# Patient Record
Sex: MALE | Race: WHITE | NOT HISPANIC OR LATINO | Employment: OTHER | URBAN - METROPOLITAN AREA
[De-identification: names, ages, dates, MRNs, and addresses within clinical notes are randomized per-mention and may not be internally consistent; named-entity substitution may affect disease eponyms.]

---

## 2018-04-16 DIAGNOSIS — I25.10 CORONARY ARTERY DISEASE, ANGINA PRESENCE UNSPECIFIED, UNSPECIFIED VESSEL OR LESION TYPE, UNSPECIFIED WHETHER NATIVE OR TRANSPLANTED HEART: Primary | ICD-10-CM

## 2018-04-17 ENCOUNTER — DOCUMENTATION ONLY (OUTPATIENT)
Dept: CARDIOLOGY | Facility: CLINIC | Age: 68
End: 2018-04-17

## 2018-04-17 ENCOUNTER — OFFICE VISIT (OUTPATIENT)
Dept: CARDIOLOGY | Facility: CLINIC | Age: 68
End: 2018-04-17
Payer: MEDICARE

## 2018-04-17 VITALS
HEIGHT: 67 IN | BODY MASS INDEX: 30.49 KG/M2 | HEART RATE: 77 BPM | DIASTOLIC BLOOD PRESSURE: 68 MMHG | SYSTOLIC BLOOD PRESSURE: 101 MMHG | WEIGHT: 194.25 LBS | OXYGEN SATURATION: 98 %

## 2018-04-17 DIAGNOSIS — Z01.818 PREOP TESTING: ICD-10-CM

## 2018-04-17 DIAGNOSIS — G63 POLYNEUROPATHY ASSOCIATED WITH UNDERLYING DISEASE: ICD-10-CM

## 2018-04-17 DIAGNOSIS — I73.9 PAD (PERIPHERAL ARTERY DISEASE): Primary | ICD-10-CM

## 2018-04-17 DIAGNOSIS — I25.10 CORONARY ARTERY DISEASE, ANGINA PRESENCE UNSPECIFIED, UNSPECIFIED VESSEL OR LESION TYPE, UNSPECIFIED WHETHER NATIVE OR TRANSPLANTED HEART: ICD-10-CM

## 2018-04-17 DIAGNOSIS — E11.40 TYPE 2 DIABETES MELLITUS WITH DIABETIC NEUROPATHY, WITHOUT LONG-TERM CURRENT USE OF INSULIN: ICD-10-CM

## 2018-04-17 DIAGNOSIS — I10 HYPERTENSION, UNSPECIFIED TYPE: ICD-10-CM

## 2018-04-17 PROBLEM — E11.9 DM (DIABETES MELLITUS): Status: ACTIVE | Noted: 2018-04-17

## 2018-04-17 PROBLEM — G62.9 PERIPHERAL NEUROPATHY: Status: ACTIVE | Noted: 2018-04-17

## 2018-04-17 PROCEDURE — 99999 PR PBB SHADOW E&M-EST. PATIENT-LVL III: CPT | Mod: PBBFAC,,, | Performed by: INTERNAL MEDICINE

## 2018-04-17 PROCEDURE — 99205 OFFICE O/P NEW HI 60 MIN: CPT | Mod: S$PBB,,, | Performed by: INTERNAL MEDICINE

## 2018-04-17 PROCEDURE — 99213 OFFICE O/P EST LOW 20 MIN: CPT | Mod: PBBFAC | Performed by: INTERNAL MEDICINE

## 2018-04-17 RX ORDER — SODIUM CHLORIDE 9 MG/ML
3 INJECTION, SOLUTION INTRAVENOUS CONTINUOUS
Status: CANCELLED | OUTPATIENT
Start: 2018-04-17 | End: 2018-04-17

## 2018-04-17 RX ORDER — CLOPIDOGREL BISULFATE 75 MG/1
75 TABLET ORAL DAILY
Refills: 0 | COMMUNITY
Start: 2018-03-27

## 2018-04-17 RX ORDER — ISOSORBIDE MONONITRATE 30 MG/1
30 TABLET, EXTENDED RELEASE ORAL 2 TIMES DAILY
Refills: 12 | COMMUNITY
Start: 2018-03-27

## 2018-04-17 RX ORDER — LISINOPRIL 5 MG/1
TABLET ORAL
Refills: 0 | COMMUNITY
Start: 2018-03-27

## 2018-04-17 RX ORDER — GABAPENTIN 300 MG/1
300 CAPSULE ORAL 3 TIMES DAILY
Refills: 6 | COMMUNITY
Start: 2018-03-27

## 2018-04-17 RX ORDER — ATORVASTATIN CALCIUM 40 MG/1
40 TABLET, FILM COATED ORAL DAILY
Refills: 0 | COMMUNITY
Start: 2018-03-27

## 2018-04-17 RX ORDER — DIPHENHYDRAMINE HCL 25 MG
50 CAPSULE ORAL ONCE
Status: CANCELLED | OUTPATIENT
Start: 2018-04-17 | End: 2018-04-17

## 2018-04-17 RX ORDER — DILTIAZEM HYDROCHLORIDE 180 MG/1
180 CAPSULE, COATED, EXTENDED RELEASE ORAL DAILY
Refills: 0 | COMMUNITY
Start: 2018-03-27

## 2018-04-17 RX ORDER — METFORMIN HYDROCHLORIDE 1000 MG/1
1000 TABLET ORAL 2 TIMES DAILY
Refills: 3 | COMMUNITY
Start: 2018-04-11

## 2018-04-17 NOTE — PROGRESS NOTES
"lSubjective:    Patient ID:  Darren Ceballos is a 67 y.o. male who presents for evaluation of Peripheral Arterial Disease    Referring Doctor: Dr. Bennie Erwin    HPI    This is 66 y/o M w/ PAD, HTN, DM, CAD s/p PCI 12/27/17 by Dr. Arvizu, and Peripheral Neuropathy of R Leg. Referred for R SFA  Intervention.    Pt states that on walking 1 block he starts developing R calf muscle pain. It goes away on resting for approx 3 mins. Pt denies any rest pain, skin breakdown, non-healing wound/ulcers, infections, skin discoloration/gangrene of R leg.  He does feel numbness/tingling all the time in the R leg. It does not worsen w/ exertion.      Failed PTA to R SFA  on 03/01/18 by Dr. Bennie Erwin. Recc RLE retrograde PTA by Dr. Dakota Arvizu    Failed PTA to R SFA  and R prox Popliteal on 11/16/17 by Dr. Bennie Erwin    Successful PTAS w/ atherectomy and stent to L SFA and L Pop 10/19/17 by by Dr. Bennie Erwin    CTA w/ Run-Off 9/28/17:  Long  of R SFA  Multi-focal areas of mod-severe stenoses of L SFA  Three vessel run-off present b/l      ROS   Constitution: negative for - fever, chills, weight loss or weight gain  HENT: negative for - sore throat, rhinorrhea, or headache  Eyes: negative for - blurred or double vision  Cardiovascular: see above  Pulmonary: see above  Gastrointestinal: negative for - abdominal pain, nausea, vomiting, or diarrhea  : negative for - dysuria  Neurological: negative for - focal weakness or sensory changes       Objective:    Physical Exam   /68 (BP Location: Right arm, Patient Position: Sitting, BP Method: Large (Automatic))   Pulse 77   Ht 5' 7" (1.702 m)   Wt 88.1 kg (194 lb 3.6 oz)   SpO2 98%   BMI 30.42 kg/m²      Constitutional: No apparent distress, conversant  HEENT: Sclera anicteric, extraocular movements intact  Neck: No jugular venous distension, no carotid bruits  Cardiac: Regular rate and rhythm, no JVD,  no murmurs rubs or gallops, normal S1/S2, no LE edema  Pulm: Clear " to auscultation bilaterally, no wheezes, rales, or ronchi  GI: Abdomen soft, nontender, nondistended  Skin: No ecchymosis, erythema, or ulcers  Psych: Alert and oriented to person place location, appropriate affect  Neuro: No focal deficits  Vascular:   RIGHT LEFT   RADIAL 2+ 2+   ULNAR 2+ 2+   BRACHIAL 2+ 2+   FEMORAL 1+ 2+   DP Monophasic Biphasic   TP Reduced Biphasic Biphasic   CHARITY'S TEST Normal Normal   BARBEAU TEST         Recent Interventions:  Failed PTA to R SFA  on 03/01/18 by Dr. Bennie Erwin. Recc RLE retrograde PTA by Dr. Dakota Arvizu    Failed PTA to R SFA  and R prox Popliteal on 11/16/17 by Dr. Bennie Erwin    Successful PTAS w/ atherectomy and stent to L SFA and L Pop 10/19/17 by by Dr. Bennie Erwin      CTA w/ Run-Off 9/28/17:  Long  of R SFA  Multi-focal areas of mod-severe stenoses of L SFA  Three vessel run-off present b/l    Assessment:       1. PAD (peripheral artery disease)    2. Hypertension, unspecified type    3. Type 2 diabetes mellitus with diabetic neuropathy, without long-term current use of insulin    4. Polyneuropathy associated with underlying disease    5. Coronary artery disease, angina presence unspecified, unspecified vessel or lesion type, unspecified whether native or transplanted heart         Plan:       PAD (peripheral artery disease)  RLE Raj 2b Claudication  Plan forR SFA  Intervention, possible retrograde approach  - Cardiac Catheterization with probable PCI  - Anti-platelet Therapy:  mg load x1, on home Plavix 75 mg  - Access: L CFA 6F cross-over sheath  - Catheters: ANTOINE  - Serum Creatinine/CrCl: Pending  - Allergies: No shellfish/Iodine allergy  - Pre-Hydration: 3 cc/kg/hr IV, continuous, for 1 hour, Pre-Procedure  - Pre-Op Med: Diphenhydramine (Benadryl) 50 mg, Oral, Once, Pre-Procedure   - Pt is a BREEZY candidate and understands the importance of taking Ticagrelor 90 mg BID or Plavix 75 mg daily or Prasugrel 10 mg daily for at least one year,  understands that in case of receiving a drug coated stent the failure to comply with dual anti-platelet therapy is likely to result in stent clotting, heart attack and death.   - The patient understands the risks and benefits and wishes to go ahead with the procedure.  - All patient's questions were answered.  - The risks, benefits & alternatives of the procedure were explained to the patient.   - The risks of peripheral angiography include but are not limited to: Bleeding, infection, heart rhythm abnormalities, allergic reactions, kidney injury requiring dilaysis, limb loss, stroke and death.   - Should stenting be indicated, the patient has agreed to dual anti-platelet therapy for 1-consecutive year with a drug-eluting stent and a minimum of 1-month with the use of a bare metal stent.   - Additionally, pt is aware that non-compliance is likely to result in stent clotting with heart attack, heart failure, and/or death.  - The risks of moderate sedation include hypotension, respiratory depression, arrhythmias, bronchospasm, & death.   - Informed consent was obtained & the patient is agreeable to proceed with the procedure.  - This patient was discussed with the attending interventional cardiologist who agrees with the above assessment & plan.      HTN (hypertension)  BP controlled  Cont home meds    CAD (coronary artery disease)  CAD s/p PCI 12/27/17 by Dr. Arvizu  No angina  Cont home meds    DM (diabetes mellitus)  On metformin  Pt unsure of A1C    Peripheral neuropathy  Diabetic Neuropathy  On gabapentin      Above plan d/w Dr. Thomas who is in agreement.    Please page/call if any questions or concerns.     Joby Soto MD  Interventional Cardiology Fellow  Pager: [phone]        I have personally taken the history and examined this patient. I have discussed and agree with the resident's findings and plan as documented in the resident's note.  Jorge Thomas

## 2018-04-17 NOTE — ASSESSMENT & PLAN NOTE
RLE Raj 2b Claudication  Plan forR SFA  Intervention, possible retrograde approach  - Cardiac Catheterization with probable PCI  - Anti-platelet Therapy:  mg load x1, on home Plavix 75 mg  - Access: L CFA 6F cross-over sheath  - Catheters: ANTOINE  - Serum Creatinine/CrCl: Pending  - Allergies: No shellfish/Iodine allergy  - Pre-Hydration: 3 cc/kg/hr IV, continuous, for 1 hour, Pre-Procedure  - Pre-Op Med: Diphenhydramine (Benadryl) 50 mg, Oral, Once, Pre-Procedure   - Pt is a BREEZY candidate and understands the importance of taking Ticagrelor 90 mg BID or Plavix 75 mg daily or Prasugrel 10 mg daily for at least one year, understands that in case of receiving a drug coated stent the failure to comply with dual anti-platelet therapy is likely to result in stent clotting, heart attack and death.   - The patient understands the risks and benefits and wishes to go ahead with the procedure.  - All patient's questions were answered.  - The risks, benefits & alternatives of the procedure were explained to the patient.   - The risks of peripheral angiography include but are not limited to: Bleeding, infection, heart rhythm abnormalities, allergic reactions, kidney injury requiring dilaysis, limb loss, stroke and death.   - Should stenting be indicated, the patient has agreed to dual anti-platelet therapy for 1-consecutive year with a drug-eluting stent and a minimum of 1-month with the use of a bare metal stent.   - Additionally, pt is aware that non-compliance is likely to result in stent clotting with heart attack, heart failure, and/or death.  - The risks of moderate sedation include hypotension, respiratory depression, arrhythmias, bronchospasm, & death.   - Informed consent was obtained & the patient is agreeable to proceed with the procedure.  - This patient was discussed with the attending interventional cardiologist who agrees with the above assessment & plan.

## 2018-04-17 NOTE — LETTER
April 17, 2018      Bennie Erwin MD  [address]  [place] [address]           [facility]  [address]  [facility] [address]  Phone: [phone]          Patient: Darren Ceballos   MR Number: [id number]   YOB: 1950   Date of Visit: 4/17/2018       Dear Dr. Bennie Erwin:    Thank you for referring Darren Ceballos to me for evaluation. Attached you will find relevant portions of my assessment and plan of care.    If you have questions, please do not hesitate to call me. I look forward to following Darren Ceballos along with you.    Sincerely,    Jorge Thomas MD    Enclosure  CC:  No Recipients    If you would like to receive this communication electronically, please contact [email or web address] or [phone] to request more information on [employer] Link access.    For providers and/or their staff who would like to refer a patient to [facility], please contact us through our one-stop-shop provider referral line, [facility], at [phone].    If you feel you have received this communication in error or would no longer like to receive these types of communications, please e-mail [email or web address]

## 2018-04-17 NOTE — PROGRESS NOTES
OUTPATIENT CATHETERIZATION INSTRUCTIONS    You have been scheduled for a procedure in the catheterization lab on Wednesday, May 2,  2018.     Please report to the Cardiology Waiting Area on the Third floor of the hospital and check in at 9 AM.   You will then be taken to the SSCU (Short Stay Cardiac Unit) and prepared for your procedure. Please be aware that this is not the time of your procedure but the time you are to arrive. The procedures are scheduled on an hourly basis; however, emergency cases take precedence over all other cases.       You may not have anything to eat or drink after midnight the night before your test. You may take your regular morning medications with water. If there are any medications that you should not take you will be instructed to hold them that morning. If you are diabetic and on Metformin (Glucophage) do not take it the day before, the day of, and for 2 days after your procedure.      The procedure will take 1-2 hours to perform. After the procedure, you will return to SSCU on the third floor of the hospital. You will need to lie still (or keep your arm still) for the next 4 to 6 hours to minimize bleeding from the puncture site. Your family may remain in the room with you during this time.       You may be able to be discharged home that same afternoon if there is someone to drive you home and there were no complications. If you have one of the balloon, stent, or device procedures you may spend the night in the hospital. Your doctor will determine, based on your progress, the date and time of your discharge. The results of your procedure will be discussed with you before you are discharged. Any further testing or procedures will be scheduled for you either before you leave or you will be called with these appointments.       If you should have any questions, concerns, or need to change the date of your procedure, please call  RADHA Eaton @ [phone]    Special  Instructions:  Stop taking your Metformin (Glucophage) on Tuesday, May 1 2018.           THE ABOVE INSTRUCTIONS WERE GIVEN TO THE PATIENT VERBALLY AND THEY VERBALIZED UNDERSTANDING.  THEY DO NOT REQUIRE ANY SPECIAL NEEDS AND DO NOT HAVE ANY LEARNING BARRIERS.          Directions for Reporting to Cardiology Waiting Area in the Hospital  If you park in the Parking Garage:  Take elevators to the1st floor of the parking garage.  Continue past the gift shop, coffee shop, and piano.  Take a right and go to the gold elevators. (Elevator B)  Take the elevator to the 3rd floor.  Follow the arrow on the sign on the wall that says Cath Lab Registration/EP Lab Registration.  Follow the long hallway all the way around until you come to a big open area.  This is the registration area.  Check in at Reception Desk.    OR    If family is dropping you off:  Have them drop you off at the front of the Hospital under the green overhang.  Enter through the doors and take a right.  Take the E elevators to the 3rd floor Cardiology Waiting Area.  Check in at the Reception Desk in the waiting room.

## 2018-05-02 ENCOUNTER — HOSPITAL ENCOUNTER (OUTPATIENT)
Facility: HOSPITAL | Age: 68
Discharge: HOME | End: 2018-05-03
Attending: INTERNAL MEDICINE | Admitting: INTERNAL MEDICINE
Payer: MEDICARE

## 2018-05-02 ENCOUNTER — SURGERY (OUTPATIENT)
Age: 68
End: 2018-05-02

## 2018-05-02 DIAGNOSIS — Z01.818 PREOP TESTING: ICD-10-CM

## 2018-05-02 DIAGNOSIS — I10 ESSENTIAL (PRIMARY) HYPERTENSION: ICD-10-CM

## 2018-05-02 DIAGNOSIS — I73.9 PAD (PERIPHERAL ARTERY DISEASE): ICD-10-CM

## 2018-05-02 LAB
ABO + RH BLD: NORMAL
ANION GAP SERPL CALC-SCNC: 8 MMOL/L
BASOPHILS # BLD AUTO: 0.03 K/UL
BASOPHILS NFR BLD: 0.6 %
BLD GP AB SCN CELLS X3 SERPL QL: NORMAL
BUN SERPL-MCNC: 14 MG/DL
CALCIUM SERPL-MCNC: 9.3 MG/DL
CHLORIDE SERPL-SCNC: 106 MMOL/L
CO2 SERPL-SCNC: 24 MMOL/L
CREAT SERPL-MCNC: 1 MG/DL
DIFFERENTIAL METHOD: ABNORMAL
EOSINOPHIL # BLD AUTO: 0.2 K/UL
EOSINOPHIL NFR BLD: 3.2 %
ERYTHROCYTE [DISTWIDTH] IN BLOOD BY AUTOMATED COUNT: 15.1 %
EST. GFR  (AFRICAN AMERICAN): >60 ML/MIN/1.73 M^2
EST. GFR  (NON AFRICAN AMERICAN): >60 ML/MIN/1.73 M^2
GLUCOSE SERPL-MCNC: 205 MG/DL
HCT VFR BLD AUTO: 33.8 %
HGB BLD-MCNC: 10.9 G/DL
IMM GRANULOCYTES # BLD AUTO: 0.15 K/UL
IMM GRANULOCYTES NFR BLD AUTO: 3 %
LYMPHOCYTES # BLD AUTO: 1.2 K/UL
LYMPHOCYTES NFR BLD: 23.4 %
MCH RBC QN AUTO: 27.7 PG
MCHC RBC AUTO-ENTMCNC: 32.2 G/DL
MCV RBC AUTO: 86 FL
MONOCYTES # BLD AUTO: 0.6 K/UL
MONOCYTES NFR BLD: 11.1 %
NEUTROPHILS # BLD AUTO: 3 K/UL
NEUTROPHILS NFR BLD: 58.7 %
NRBC BLD-RTO: 0 /100 WBC
PERIPHERAL PTA: YES
PERIPHERAL STENOSIS: ABNORMAL
PLATELET # BLD AUTO: 225 K/UL
PMV BLD AUTO: 11.1 FL
POCT GLUCOSE: 160 MG/DL (ref 70–110)
POCT GLUCOSE: 163 MG/DL (ref 70–110)
POCT GLUCOSE: 229 MG/DL (ref 70–110)
POTASSIUM SERPL-SCNC: 4.4 MMOL/L
RBC # BLD AUTO: 3.93 M/UL
SODIUM SERPL-SCNC: 138 MMOL/L
WBC # BLD AUTO: 5.04 K/UL

## 2018-05-02 PROCEDURE — 82962 GLUCOSE BLOOD TEST: CPT | Mod: 91

## 2018-05-02 PROCEDURE — 63600175 PHARM REV CODE 636 W HCPCS

## 2018-05-02 PROCEDURE — 80048 BASIC METABOLIC PNL TOTAL CA: CPT

## 2018-05-02 PROCEDURE — 27000014 CATH LAB PROCEDURE

## 2018-05-02 PROCEDURE — 86901 BLOOD TYPING SEROLOGIC RH(D): CPT

## 2018-05-02 PROCEDURE — 25000003 PHARM REV CODE 250

## 2018-05-02 PROCEDURE — 25000003 PHARM REV CODE 250: Performed by: INTERNAL MEDICINE

## 2018-05-02 PROCEDURE — 85025 COMPLETE CBC W/AUTO DIFF WBC: CPT

## 2018-05-02 PROCEDURE — 75710 ARTERY X-RAYS ARM/LEG: CPT | Mod: 26,59,, | Performed by: INTERNAL MEDICINE

## 2018-05-02 PROCEDURE — 93010 ELECTROCARDIOGRAM REPORT: CPT | Mod: ,,, | Performed by: INTERNAL MEDICINE

## 2018-05-02 PROCEDURE — 37225 CATH LAB PROCEDURE: CPT | Mod: RT,,, | Performed by: INTERNAL MEDICINE

## 2018-05-02 PROCEDURE — C1769 GUIDE WIRE: HCPCS

## 2018-05-02 PROCEDURE — 93005 ELECTROCARDIOGRAM TRACING: CPT

## 2018-05-02 PROCEDURE — 99152 MOD SED SAME PHYS/QHP 5/>YRS: CPT | Mod: ,,, | Performed by: INTERNAL MEDICINE

## 2018-05-02 RX ORDER — INSULIN ASPART 100 [IU]/ML
0-5 INJECTION, SOLUTION INTRAVENOUS; SUBCUTANEOUS
Status: DISCONTINUED | OUTPATIENT
Start: 2018-05-02 | End: 2018-05-03 | Stop reason: HOSPADM

## 2018-05-02 RX ORDER — GLUCAGON 1 MG
1 KIT INJECTION
Status: DISCONTINUED | OUTPATIENT
Start: 2018-05-02 | End: 2018-05-03 | Stop reason: HOSPADM

## 2018-05-02 RX ORDER — LIDOCAINE HCL/EPINEPHRINE/PF 2%-1:200K
1 VIAL (ML) INJECTION ONCE
Status: DISCONTINUED | OUTPATIENT
Start: 2018-05-02 | End: 2018-05-03 | Stop reason: HOSPADM

## 2018-05-02 RX ORDER — SODIUM CHLORIDE 9 MG/ML
3 INJECTION, SOLUTION INTRAVENOUS CONTINUOUS
Status: ACTIVE | OUTPATIENT
Start: 2018-05-02 | End: 2018-05-02

## 2018-05-02 RX ORDER — IBUPROFEN 200 MG
16 TABLET ORAL
Status: DISCONTINUED | OUTPATIENT
Start: 2018-05-02 | End: 2018-05-03 | Stop reason: HOSPADM

## 2018-05-02 RX ORDER — NAPROXEN SODIUM 220 MG/1
81 TABLET, FILM COATED ORAL DAILY
COMMUNITY

## 2018-05-02 RX ORDER — CLOPIDOGREL BISULFATE 75 MG/1
75 TABLET ORAL DAILY
Status: DISCONTINUED | OUTPATIENT
Start: 2018-05-03 | End: 2018-05-03 | Stop reason: HOSPADM

## 2018-05-02 RX ORDER — GABAPENTIN 300 MG/1
300 CAPSULE ORAL 3 TIMES DAILY
Status: DISCONTINUED | OUTPATIENT
Start: 2018-05-02 | End: 2018-05-03 | Stop reason: HOSPADM

## 2018-05-02 RX ORDER — DIPHENHYDRAMINE HCL 50 MG
50 CAPSULE ORAL ONCE
Status: COMPLETED | OUTPATIENT
Start: 2018-05-02 | End: 2018-05-02

## 2018-05-02 RX ORDER — NAPROXEN SODIUM 220 MG/1
81 TABLET, FILM COATED ORAL DAILY
Status: DISCONTINUED | OUTPATIENT
Start: 2018-05-03 | End: 2018-05-03 | Stop reason: HOSPADM

## 2018-05-02 RX ORDER — ACETAMINOPHEN 325 MG/1
650 TABLET ORAL EVERY 6 HOURS PRN
Status: DISCONTINUED | OUTPATIENT
Start: 2018-05-02 | End: 2018-05-03 | Stop reason: HOSPADM

## 2018-05-02 RX ORDER — ATORVASTATIN CALCIUM 20 MG/1
40 TABLET, FILM COATED ORAL DAILY
Status: DISCONTINUED | OUTPATIENT
Start: 2018-05-03 | End: 2018-05-03 | Stop reason: HOSPADM

## 2018-05-02 RX ORDER — IBUPROFEN 200 MG
24 TABLET ORAL
Status: DISCONTINUED | OUTPATIENT
Start: 2018-05-02 | End: 2018-05-03 | Stop reason: HOSPADM

## 2018-05-02 RX ORDER — DILTIAZEM HYDROCHLORIDE 180 MG/1
180 CAPSULE, COATED, EXTENDED RELEASE ORAL DAILY
Status: DISCONTINUED | OUTPATIENT
Start: 2018-05-03 | End: 2018-05-03 | Stop reason: HOSPADM

## 2018-05-02 RX ORDER — ISOSORBIDE MONONITRATE 30 MG/1
30 TABLET, EXTENDED RELEASE ORAL 2 TIMES DAILY
Status: DISCONTINUED | OUTPATIENT
Start: 2018-05-02 | End: 2018-05-03 | Stop reason: HOSPADM

## 2018-05-02 RX ADMIN — SODIUM CHLORIDE 3 ML/KG/HR: 0.9 INJECTION, SOLUTION INTRAVENOUS at 08:05

## 2018-05-02 RX ADMIN — ACETAMINOPHEN 650 MG: 325 TABLET ORAL at 06:05

## 2018-05-02 RX ADMIN — DIPHENHYDRAMINE HYDROCHLORIDE 50 MG: 50 CAPSULE ORAL at 08:05

## 2018-05-02 RX ADMIN — ISOSORBIDE MONONITRATE 30 MG: 30 TABLET, EXTENDED RELEASE ORAL at 09:05

## 2018-05-02 NOTE — INTERVAL H&P NOTE
The patient has been examined and the H&P has been reviewed:    I concur with the findings and no changes have occurred since H&P was written.   NPO since 8 PM last night.  Here for R SFA  intervention.  L CFA Access, 6 Fr crossover sheath.  Has been taking ASA and Plavix.  Consents in chart from clinic.    Anesthesia/Surgery risks, benefits and alternative options discussed and understood by patient/family.    Active Hospital Problems    Diagnosis  POA    PAD (peripheral artery disease) [I73.9]  Yes      Resolved Hospital Problems    Diagnosis Date Resolved POA   No resolved problems to display.

## 2018-05-02 NOTE — H&P (VIEW-ONLY)
"lSubjective:    Patient ID:  Darren Ceballos is a 67 y.o. male who presents for evaluation of Peripheral Arterial Disease    Referring Doctor: Dr. Bennie Erwin    HPI    This is 68 y/o M w/ PAD, HTN, DM, CAD s/p PCI 12/27/17 by Dr. Arvizu, and Peripheral Neuropathy of R Leg. Referred for R SFA  Intervention.    Pt states that on walking 1 block he starts developing R calf muscle pain. It goes away on resting for approx 3 mins. Pt denies any rest pain, skin breakdown, non-healing wound/ulcers, infections, skin discoloration/gangrene of R leg.  He does feel numbness/tingling all the time in the R leg. It does not worsen w/ exertion.      Failed PTA to R SFA  on 03/01/18 by Dr. Bennie Erwin. Recc RLE retrograde PTA by Dr. Dakota Arvizu    Failed PTA to R SFA  and R prox Popliteal on 11/16/17 by Dr. Bennie Erwin    Successful PTAS w/ atherectomy and stent to L SFA and L Pop 10/19/17 by by Dr. Bennie Erwin    CTA w/ Run-Off 9/28/17:  Long  of R SFA  Multi-focal areas of mod-severe stenoses of L SFA  Three vessel run-off present b/l      ROS   Constitution: negative for - fever, chills, weight loss or weight gain  HENT: negative for - sore throat, rhinorrhea, or headache  Eyes: negative for - blurred or double vision  Cardiovascular: see above  Pulmonary: see above  Gastrointestinal: negative for - abdominal pain, nausea, vomiting, or diarrhea  : negative for - dysuria  Neurological: negative for - focal weakness or sensory changes       Objective:    Physical Exam   /68 (BP Location: Right arm, Patient Position: Sitting, BP Method: Large (Automatic))   Pulse 77   Ht 5' 7" (1.702 m)   Wt 88.1 kg (194 lb 3.6 oz)   SpO2 98%   BMI 30.42 kg/m²      Constitutional: No apparent distress, conversant  HEENT: Sclera anicteric, extraocular movements intact  Neck: No jugular venous distension, no carotid bruits  Cardiac: Regular rate and rhythm, no JVD,  no murmurs rubs or gallops, normal S1/S2, no LE edema  Pulm: Clear " to auscultation bilaterally, no wheezes, rales, or ronchi  GI: Abdomen soft, nontender, nondistended  Skin: No ecchymosis, erythema, or ulcers  Psych: Alert and oriented to person place location, appropriate affect  Neuro: No focal deficits  Vascular:   RIGHT LEFT   RADIAL 2+ 2+   ULNAR 2+ 2+   BRACHIAL 2+ 2+   FEMORAL 1+ 2+   DP Monophasic Biphasic   TP Reduced Biphasic Biphasic   CHARITY'S TEST Normal Normal   BARBEAU TEST         Recent Interventions:  Failed PTA to R SFA  on 03/01/18 by Dr. Bennie Erwin. Recc RLE retrograde PTA by Dr. Dakota Arvizu    Failed PTA to R SFA  and R prox Popliteal on 11/16/17 by Dr. Bennie Erwin    Successful PTAS w/ atherectomy and stent to L SFA and L Pop 10/19/17 by by Dr. Bennie Erwin      CTA w/ Run-Off 9/28/17:  Long  of R SFA  Multi-focal areas of mod-severe stenoses of L SFA  Three vessel run-off present b/l    Assessment:       1. PAD (peripheral artery disease)    2. Hypertension, unspecified type    3. Type 2 diabetes mellitus with diabetic neuropathy, without long-term current use of insulin    4. Polyneuropathy associated with underlying disease    5. Coronary artery disease, angina presence unspecified, unspecified vessel or lesion type, unspecified whether native or transplanted heart         Plan:       PAD (peripheral artery disease)  RLE Raj 2b Claudication  Plan forR SFA  Intervention, possible retrograde approach  - Cardiac Catheterization with probable PCI  - Anti-platelet Therapy:  mg load x1, on home Plavix 75 mg  - Access: L CFA 6F cross-over sheath  - Catheters: ANTOINE  - Serum Creatinine/CrCl: Pending  - Allergies: No shellfish/Iodine allergy  - Pre-Hydration: 3 cc/kg/hr IV, continuous, for 1 hour, Pre-Procedure  - Pre-Op Med: Diphenhydramine (Benadryl) 50 mg, Oral, Once, Pre-Procedure   - Pt is a BREEZY candidate and understands the importance of taking Ticagrelor 90 mg BID or Plavix 75 mg daily or Prasugrel 10 mg daily for at least one year,  understands that in case of receiving a drug coated stent the failure to comply with dual anti-platelet therapy is likely to result in stent clotting, heart attack and death.   - The patient understands the risks and benefits and wishes to go ahead with the procedure.  - All patient's questions were answered.  - The risks, benefits & alternatives of the procedure were explained to the patient.   - The risks of peripheral angiography include but are not limited to: Bleeding, infection, heart rhythm abnormalities, allergic reactions, kidney injury requiring dilaysis, limb loss, stroke and death.   - Should stenting be indicated, the patient has agreed to dual anti-platelet therapy for 1-consecutive year with a drug-eluting stent and a minimum of 1-month with the use of a bare metal stent.   - Additionally, pt is aware that non-compliance is likely to result in stent clotting with heart attack, heart failure, and/or death.  - The risks of moderate sedation include hypotension, respiratory depression, arrhythmias, bronchospasm, & death.   - Informed consent was obtained & the patient is agreeable to proceed with the procedure.  - This patient was discussed with the attending interventional cardiologist who agrees with the above assessment & plan.      HTN (hypertension)  BP controlled  Cont home meds    CAD (coronary artery disease)  CAD s/p PCI 12/27/17 by Dr. Arvizu  No angina  Cont home meds    DM (diabetes mellitus)  On metformin  Pt unsure of A1C    Peripheral neuropathy  Diabetic Neuropathy  On gabapentin      Above plan d/w Dr. Thomas who is in agreement.    Please page/call if any questions or concerns.     Joby Soto MD  Interventional Cardiology Fellow  Pager: [phone]        I have personally taken the history and examined this patient. I have discussed and agree with the resident's findings and plan as documented in the resident's note.  Jorge Thomas

## 2018-05-02 NOTE — PLAN OF CARE
Problem: Patient Care Overview  Goal: Plan of Care Review  Outcome: Ongoing (interventions implemented as appropriate)  Report received from RADHA Harrington. Patient s/p RLE angiogram.

## 2018-05-02 NOTE — BRIEF OP NOTE
"    Post Cath Note  Referring Physician: Jorge Thomas MD  Procedure: Pta-Peripheral (Right)       Access: Left CFA, R Dorsalis Pedis    R SFA     See full report for further details    Intervention:     Outback and Laser Atherectomy to R SFA  DCB x 3 with 5.0 x 150 mm balloons    Closure device: Mynx to L CFA, Manual pressure to R DP    Post Cath Exam:   /66 (BP Location: Left arm, Patient Position: Lying)   Pulse 63   Temp 96.1 °F (35.6 °C) (Oral)   Resp 18   Ht 5' 8" (1.727 m)   Wt 86.2 kg (190 lb)   SpO2 99%   BMI 28.89 kg/m²   No unusual pain, hematoma, thrill or bruit at vascular access site.  Distal pulse present without signs of ischemia.    Recommendations:   - Routine post-cath care  - IVF at 3 cc/kg/hr for 4 hrs  - Continue medical management  - Risk factor reduction  - Plavix for at least 1 month and ASA 81 mg indefinitely  - Follow-up with outpatient cardiologist (Dr. Erwin)    Signed:  Matthew To MD  Cardiology Fellow, PGY-5  5/2/2018 3:41 PM    "

## 2018-05-02 NOTE — PLAN OF CARE
Problem: Patient Care Overview  Goal: Plan of Care Review  Report received from RADHA Oconnor. Patient s/p dccv. Vss. No complaints from patient. Call light in reach. Will monitor.

## 2018-05-03 VITALS
TEMPERATURE: 99 F | BODY MASS INDEX: 28.79 KG/M2 | SYSTOLIC BLOOD PRESSURE: 138 MMHG | HEART RATE: 78 BPM | RESPIRATION RATE: 17 BRPM | WEIGHT: 190 LBS | DIASTOLIC BLOOD PRESSURE: 78 MMHG | OXYGEN SATURATION: 99 % | HEIGHT: 68 IN

## 2018-05-03 LAB — POCT GLUCOSE: 136 MG/DL (ref 70–110)

## 2018-05-03 PROCEDURE — 93926 LOWER EXTREMITY STUDY: CPT

## 2018-05-03 PROCEDURE — 25000003 PHARM REV CODE 250: Performed by: INTERNAL MEDICINE

## 2018-05-03 PROCEDURE — 93926 LOWER EXTREMITY STUDY: CPT | Mod: 26,,, | Performed by: INTERNAL MEDICINE

## 2018-05-03 RX ORDER — TRAMADOL HYDROCHLORIDE 50 MG/1
50 TABLET ORAL EVERY 6 HOURS PRN
Status: DISCONTINUED | OUTPATIENT
Start: 2018-05-03 | End: 2018-05-03 | Stop reason: HOSPADM

## 2018-05-03 RX ADMIN — GABAPENTIN 300 MG: 300 CAPSULE ORAL at 08:05

## 2018-05-03 RX ADMIN — CLOPIDOGREL 75 MG: 75 TABLET, FILM COATED ORAL at 08:05

## 2018-05-03 RX ADMIN — TRAMADOL HYDROCHLORIDE 50 MG: 50 TABLET, FILM COATED ORAL at 08:05

## 2018-05-03 RX ADMIN — GABAPENTIN 300 MG: 300 CAPSULE ORAL at 03:05

## 2018-05-03 RX ADMIN — TRAMADOL HYDROCHLORIDE 50 MG: 50 TABLET, FILM COATED ORAL at 01:05

## 2018-05-03 RX ADMIN — ASPIRIN 81 MG 81 MG: 81 TABLET ORAL at 08:05

## 2018-05-03 RX ADMIN — DILTIAZEM HYDROCHLORIDE 180 MG: 180 CAPSULE, COATED, EXTENDED RELEASE ORAL at 08:05

## 2018-05-03 RX ADMIN — ATORVASTATIN CALCIUM 40 MG: 20 TABLET, FILM COATED ORAL at 08:05

## 2018-05-03 RX ADMIN — ISOSORBIDE MONONITRATE 30 MG: 30 TABLET, EXTENDED RELEASE ORAL at 08:05

## 2018-05-03 NOTE — PLAN OF CARE
Problem: Patient Care Overview  Goal: Plan of Care Review  Outcome: Ongoing (interventions implemented as appropriate)  Patient remained free from falls/injury/trauma throughout shift. No complaints of chest pain or SOB. Patient did report right and hip pain. Gave PRN tramadol 50 mg PO and patient fell asleep shortly afterwards. Left groin site is CDI with no signs of active bleeding or hematoma. Right foot is CDI with no signs of active bleeding or hematoma. Vital signs stable. Plan of care reviewed with patient and spouse. Both verbalized understanding. All question encouraged and answered. Will continue to monitor.

## 2018-05-03 NOTE — NURSING
Patient complaining of 8 out of 10 pain in right hip and down the right leg. Notified Dr. STONE Park MD. Ordered to give tramadol 50 mg PO. Will administer and continue to monitor.

## 2018-05-03 NOTE — DISCHARGE SUMMARY
Discharge Summary  Interventional Cardiology      Admit Date: 5/2/2018    Discharge Date:  5/3/2018    Attending Physician: Jorge Thomas MD    Discharge Physician: Matthew To MD    Principal Diagnoses: PAD (peripheral artery disease)  Indication for Admission: Pta-Peripheral (Right)    Discharged Condition: Good    Hospital Course:   Patient presented for outpatient PTA which went without complication. Patient underwent a PTA to an RFA  with an Outback, Laser Atherectomy, and   DCB x 3 with 5 x 150 mm balloons via L CFA and R DP access. Please see full cath report in EPIC for details. He was monitored overnight, and underwent a RLE arterial ultrasound today. This showed higher velocities in the prox SFA consistent with residual stenosis in the R proximal SFA. He was feeling well this morning and ambulated without difficulty. Access sites were c/d/i. He was feeling well and anticipating discharge home today.    Outpatient Plan:  - Continue medical management  - Risk factor reduction  - Plavix for at least 1 month and ASA 81 mg indefinitely  - Follow-up with outpatient cardiologist (Dr. Erwin)  - There were no medication changes    Diet: Cardiac diet    Activity: Ad brian, wound care instructions provided    Disposition: Home or Self Care    Discharge Medications:      Medication List      CONTINUE taking these medications    aspirin 81 MG Chew     atorvastatin 40 MG tablet  Commonly known as:  LIPITOR     clopidogrel 75 mg tablet  Commonly known as:  PLAVIX     diltiaZEM 180 MG 24 hr capsule  Commonly known as:  CARDIZEM CD     gabapentin 300 MG capsule  Commonly known as:  NEURONTIN     isosorbide mononitrate 30 MG 24 hr tablet  Commonly known as:  IMDUR     lisinopril 5 MG tablet  Commonly known as:  PRINIVIL,ZESTRIL     metFORMIN 1000 MG tablet  Commonly known as:  GLUCOPHAGE          Follow Up:  Follow-up Information     Bennie Erwin MD.    Specialty:  Cardiothoracic Surgery  Contact  information:  [address] [facility] [address]  [place] [id number] [phone]

## 2018-05-03 NOTE — NURSING
Discharged to home: tele d/c'ed, sl d/c'ed with catheters intact x2. Instructions re: rt foot site care given to pt and spouse both verbalize understandding. Instructed to f/u as scheduled. meds reviewed, instructed to start metformin in am. Sitting at   bedside awaiting escort, no distress noted

## 2018-05-03 NOTE — NURSING
Unable to reach Dr. Abbott. Dr. Park paged to determine who is following pt. Pt want to know if he is being discharged today, what time it may happen. Dr. Park stated that he would have Dr To return my call.

## 2018-05-03 NOTE — PLAN OF CARE
Problem: Patient Care Overview  Goal: Plan of Care Review  Outcome: Ongoing (interventions implemented as appropriate)  Pt s/p angiogram, left groin site marty, no bleeding or hematoma. Rt foot access with gauze and tegaderm intact. Dp/pt +2 per doppler. C/p og pain relieved with Tramadol. cbg wdl. No fall related injury, will continue to mointor.

## 2018-05-09 DIAGNOSIS — I73.9 PAD (PERIPHERAL ARTERY DISEASE): Primary | ICD-10-CM

## 2018-05-09 LAB
POC ACTIVATED CLOTTING TIME K: 191 SEC (ref 74–137)
POC ACTIVATED CLOTTING TIME K: 224 SEC (ref 74–137)
POC ACTIVATED CLOTTING TIME K: 224 SEC (ref 74–137)
POC ACTIVATED CLOTTING TIME K: 263 SEC (ref 74–137)
SAMPLE: ABNORMAL

## 2018-06-05 ENCOUNTER — OFFICE VISIT (OUTPATIENT)
Dept: CARDIOLOGY | Facility: CLINIC | Age: 68
End: 2018-06-05
Payer: MEDICARE

## 2018-06-05 ENCOUNTER — CLINICAL SUPPORT (OUTPATIENT)
Dept: CARDIOLOGY | Facility: CLINIC | Age: 68
End: 2018-06-05
Attending: INTERNAL MEDICINE
Payer: MEDICARE

## 2018-06-05 ENCOUNTER — DOCUMENTATION ONLY (OUTPATIENT)
Dept: CARDIOLOGY | Facility: CLINIC | Age: 68
End: 2018-06-05

## 2018-06-05 VITALS
DIASTOLIC BLOOD PRESSURE: 72 MMHG | BODY MASS INDEX: 29.57 KG/M2 | SYSTOLIC BLOOD PRESSURE: 131 MMHG | HEIGHT: 68 IN | HEART RATE: 68 BPM | WEIGHT: 195.13 LBS | OXYGEN SATURATION: 98 %

## 2018-06-05 DIAGNOSIS — I10 HYPERTENSION, UNSPECIFIED TYPE: ICD-10-CM

## 2018-06-05 DIAGNOSIS — I73.9 PAD (PERIPHERAL ARTERY DISEASE): Primary | ICD-10-CM

## 2018-06-05 DIAGNOSIS — I10 ESSENTIAL HYPERTENSION: ICD-10-CM

## 2018-06-05 DIAGNOSIS — G63 POLYNEUROPATHY ASSOCIATED WITH UNDERLYING DISEASE: ICD-10-CM

## 2018-06-05 DIAGNOSIS — I73.9 PAD (PERIPHERAL ARTERY DISEASE): ICD-10-CM

## 2018-06-05 PROCEDURE — 99999 PR PBB SHADOW E&M-EST. PATIENT-LVL IV: CPT | Mod: PBBFAC,,, | Performed by: INTERNAL MEDICINE

## 2018-06-05 PROCEDURE — 99214 OFFICE O/P EST MOD 30 MIN: CPT | Mod: PBBFAC | Performed by: INTERNAL MEDICINE

## 2018-06-05 PROCEDURE — 99215 OFFICE O/P EST HI 40 MIN: CPT | Mod: S$PBB,,, | Performed by: INTERNAL MEDICINE

## 2018-06-05 PROCEDURE — 93926 LOWER EXTREMITY STUDY: CPT | Mod: PBBFAC | Performed by: INTERNAL MEDICINE

## 2018-06-05 RX ORDER — SODIUM CHLORIDE 9 MG/ML
3 INJECTION, SOLUTION INTRAVENOUS CONTINUOUS
Status: CANCELLED | OUTPATIENT
Start: 2018-06-05 | End: 2018-06-05

## 2018-06-05 RX ORDER — FAMOTIDINE 20 MG/1
20 TABLET, FILM COATED ORAL
COMMUNITY

## 2018-06-05 RX ORDER — NITROGLYCERIN 0.4 MG/1
0.4 TABLET SUBLINGUAL
COMMUNITY
Start: 2017-01-27

## 2018-06-05 RX ORDER — DIPHENHYDRAMINE HCL 25 MG
50 CAPSULE ORAL ONCE
Status: CANCELLED | OUTPATIENT
Start: 2018-06-05 | End: 2018-06-05

## 2018-06-05 NOTE — PROGRESS NOTES
"lSubjective:    Patient ID:  Darren Ceballos is a 67 y.o. male who presents for evaluation of No chief complaint on file.    Referring Doctor: Dr. Bennie Erwin    HPI     66 y/o M w/ PAD, HTN, DM, CAD s/p PCI 12/27/17, peripheral neuropathy, PAD with right SFA  was referred by Dr. Erwin for R SFA  Intervention due to Raj IIb claudication and underwent PTA to an RFA  with successful recalculation on 5/3/18(Outback, Laser Atherectomy, and DCB x 3 with 5 x 150 mm balloons via L CFA and R DP access). 3 vessel runoff on right (outflow). He present today for follow up with [employer] RLExt which is concerning for occlusion of the SFA at site of previous intervention (pSFA/mSFA)    Pt denies any rest pain, skin breakdown, non-healing wound/ulcers, infections, skin discoloration/gangrene of R leg.  He does feel numbness/tingling all the time in the R leg. It does not worsen w/ exertion.      Failed PTA to R SFA  on 03/01/18 by Dr. Bennie Erwin. Recc RLE retrograde PTA by Dr. Dakota Arvizu    Failed PTA to R SFA  and R prox Popliteal on 11/16/17 by Dr. Bennie Erwin    Successful PTAS w/ atherectomy and stent to L SFA and L Pop 10/19/17 by by Dr. Bennie Erwin    CTA w/ Run-Off 9/28/17:  Long  of R SFA  Multi-focal areas of mod-severe stenoses of L SFA  Three vessel run-off present b/l      ROS   Constitution: negative for - fever, chills, weight loss or weight gain  HENT: negative for - sore throat, rhinorrhea, or headache  Eyes: negative for - blurred or double vision  Cardiovascular: see above  Pulmonary: see above  Gastrointestinal: negative for - abdominal pain, nausea, vomiting, or diarrhea  : negative for - dysuria  Neurological: negative for - focal weakness or sensory changes       Objective:    Physical Exam   /72 (BP Location: Left arm, Patient Position: Sitting, BP Method: Large (Automatic))   Pulse 68   Ht 5' 8" (1.727 m)   Wt 88.5 kg (195 lb 1.7 oz)   SpO2 98%   BMI 29.67 kg/m²  "     Constitutional: No apparent distress, conversant  HEENT: Sclera anicteric, extraocular movements intact  Neck: No jugular venous distension, no carotid bruits  Cardiac: Regular rate and rhythm, no JVD,  no murmurs rubs or gallops, normal S1/S2, no LE edema  Pulm: Clear to auscultation bilaterally, no wheezes, rales, or ronchi  GI: Abdomen soft, nontender, nondistended  Skin: No ecchymosis, erythema, or ulcers  Psych: Alert and oriented to person place location, appropriate affect  Neuro: No focal deficits  Vascular:   RIGHT LEFT   RADIAL 2+ 2+   ULNAR 2+ 2+   BRACHIAL 2+ 2+   FEMORAL 1+ 2+   DP Monophasic Biphasic   TP Reduced Biphasic Biphasic   CHARITY'S TEST Normal Normal   BARBEAU TEST         Recent Interventions:  Failed PTA to R SFA  on 03/01/18 by Dr. Bennie Erwin. Recc RLE retrograde PTA by Dr. Dakota Arvizu    Failed PTA to R SFA  and R prox Popliteal on 11/16/17 by Dr. Bennie Erwin    Successful PTAS w/ atherectomy and stent to L SFA and L Pop 10/19/17 by by Dr. Bennie Erwin      CTA w/ Run-Off 9/28/17:  Long  of R SFA  Multi-focal areas of mod-severe stenoses of L SFA  Three vessel run-off present b/l    Assessment/Plan:   PAD (peripheral artery disease)  Right SFA re-occlusion s/p recent DCB  RLE Raj 2b Claudication    Plan forR SFA re-ntervention, Left CFA antegrade approach. Cross over. Will use Zilver PTX stents this time  - Anti-platelet Therapy: ASA 81, on home Plavix 75 mg  - Access: L CFA 6F cross-over sheath  - Catheters: ANTOINE  - Serum Creatinine/CrCl: Pending  - Allergies: No shellfish/Iodine allergy  - Pre-Hydration: 3 cc/kg/hr IV, continuous, for 1 hour, Pre-Procedure  - Pre-Op Med: Diphenhydramine (Benadryl) 50 mg, Oral, Once, Pre-Procedure   - The patient understands the risks and benefits and wishes to go ahead with the procedure.  - All patient's questions were answered.  - The risks, benefits & alternatives of the procedure were explained to the patient.   - The risks of  peripheral angiography include but are not limited to: Bleeding, infection, heart rhythm abnormalities, allergic reactions, kidney injury requiring dilaysis, limb loss, stroke and death.   - Should stenting be indicated, the patient has agreed to dual anti-platelet therapy for 1-consecutive year with a drug-eluting stent and a minimum of 1-month with the use of a bare metal stent.   - Additionally, pt is aware that non-compliance is likely to result in stent clotting with heart attack, heart failure, and/or death.  - The risks of moderate sedation include hypotension, respiratory depression, arrhythmias, bronchospasm, & death.   - Informed consent was obtained & the patient is agreeable to proceed with the procedure.  - This patient was discussed with the attending interventional cardiologist who agrees with the above assessment & plan.       HTN (hypertension)  Controlled. Continue with current medication.    Peripheral neuropathy  Continue with neuroleptics.    Clay Park MD  PGY-7  Interventional Cardiology     I have personally taken the history and examined this patient. I have discussed and agree with the resident's findings and plan as documented in the resident's note.  Repeat angioplasty and supera stent.  Jorge Thomas

## 2018-06-05 NOTE — PROGRESS NOTES
OUTPATIENT CATHETERIZATION INSTRUCTIONS    You have been scheduled for a procedure in the catheterization lab on Monday, June 18, 2018.     Please report to the Cardiology Waiting Area on the Third floor of the hospital and check in at 10 AM.   You will then be taken to the SSCU (Short Stay Cardiac Unit) and prepared for your procedure. Please be aware that this is not the time of your procedure but the time you are to arrive. The procedures are scheduled on an hourly basis; however, emergency cases take precedence over all other cases.       You may not have anything to eat or drink after midnight the night before your test. You may take your regular morning medications with water. If there are any medications that you should not take you will be instructed to hold them that morning. If you are diabetic and on Metformin (Glucophage) do not take it the day before, the day of, and for 2 days after your procedure.      The procedure will take 1-2 hours to perform. After the procedure, you will return to SSCU on the third floor of the hospital. You will need to lie still (or keep your arm still) for the next 4 to 6 hours to minimize bleeding from the puncture site. Your family may remain in the room with you during this time.       You may be able to be discharged home that same afternoon if there is someone to drive you home and there were no complications. If you have one of the balloon, stent, or device procedures you may spend the night in the hospital. Your doctor will determine, based on your progress, the date and time of your discharge. The results of your procedure will be discussed with you before you are discharged. Any further testing or procedures will be scheduled for you either before you leave or you will be called with these appointments.       If you should have any questions, concerns, or need to change the date of your procedure, please call  RADHA Eaton @ [phone]    Special  Instructions:  Stop taking your Metformin (Glucophage) on Sunday, June 17 2018.             THE ABOVE INSTRUCTIONS WERE GIVEN TO THE PATIENT VERBALLY AND THEY VERBALIZED UNDERSTANDING.  THEY DO NOT REQUIRE ANY SPECIAL NEEDS AND DO NOT HAVE ANY LEARNING BARRIERS.          Directions for Reporting to Cardiology Waiting Area in the Hospital  If you park in the Parking Garage:  Take elevators to the1st floor of the parking garage.  Continue past the gift shop, coffee shop, and piano.  Take a right and go to the gold elevators. (Elevator B)  Take the elevator to the 3rd floor.  Follow the arrow on the sign on the wall that says Cath Lab Registration/EP Lab Registration.  Follow the long hallway all the way around until you come to a big open area.  This is the registration area.  Check in at Reception Desk.    OR    If family is dropping you off:  Have them drop you off at the front of the Hospital under the green overhang.  Enter through the doors and take a right.  Take the E elevators to the 3rd floor Cardiology Waiting Area.  Check in at the Reception Desk in the waiting room.

## 2018-06-05 NOTE — ASSESSMENT & PLAN NOTE
Right SFA re-occlusion s/p recent DCB  RLE Raj 2b Claudication    Plan forR SFA re-ntervention, Left CFA antegrade approach. Cross over. Will use Zilver PTX stents this time  - Anti-platelet Therapy: ASA 81, on home Plavix 75 mg  - Access: L CFA 6F cross-over sheath  - Catheters: ANTOINE  - Serum Creatinine/CrCl: Pending  - Allergies: No shellfish/Iodine allergy  - Pre-Hydration: 3 cc/kg/hr IV, continuous, for 1 hour, Pre-Procedure  - Pre-Op Med: Diphenhydramine (Benadryl) 50 mg, Oral, Once, Pre-Procedure   - The patient understands the risks and benefits and wishes to go ahead with the procedure.  - All patient's questions were answered.  - The risks, benefits & alternatives of the procedure were explained to the patient.   - The risks of peripheral angiography include but are not limited to: Bleeding, infection, heart rhythm abnormalities, allergic reactions, kidney injury requiring dilaysis, limb loss, stroke and death.   - Should stenting be indicated, the patient has agreed to dual anti-platelet therapy for 1-consecutive year with a drug-eluting stent and a minimum of 1-month with the use of a bare metal stent.   - Additionally, pt is aware that non-compliance is likely to result in stent clotting with heart attack, heart failure, and/or death.  - The risks of moderate sedation include hypotension, respiratory depression, arrhythmias, bronchospasm, & death.   - Informed consent was obtained & the patient is agreeable to proceed with the procedure.  - This patient was discussed with the attending interventional cardiologist who agrees with the above assessment & plan.

## 2018-06-18 ENCOUNTER — HOSPITAL ENCOUNTER (OUTPATIENT)
Facility: HOSPITAL | Age: 68
Discharge: HOME | End: 2018-06-19
Attending: INTERNAL MEDICINE | Admitting: INTERNAL MEDICINE
Payer: MEDICARE

## 2018-06-18 DIAGNOSIS — I73.9 PAD (PERIPHERAL ARTERY DISEASE): ICD-10-CM

## 2018-06-18 DIAGNOSIS — I10 ESSENTIAL (PRIMARY) HYPERTENSION: ICD-10-CM

## 2018-06-18 LAB
ABO + RH BLD: NORMAL
ANION GAP SERPL CALC-SCNC: 9 MMOL/L
BLD GP AB SCN CELLS X3 SERPL QL: NORMAL
BUN SERPL-MCNC: 14 MG/DL
CALCIUM SERPL-MCNC: 9.1 MG/DL
CHLORIDE SERPL-SCNC: 104 MMOL/L
CO2 SERPL-SCNC: 24 MMOL/L
CREAT SERPL-MCNC: 1 MG/DL
EST. GFR  (AFRICAN AMERICAN): >60 ML/MIN/1.73 M^2
EST. GFR  (NON AFRICAN AMERICAN): >60 ML/MIN/1.73 M^2
GLUCOSE SERPL-MCNC: 254 MG/DL
POCT GLUCOSE: 175 MG/DL (ref 70–110)
POCT GLUCOSE: 252 MG/DL (ref 70–110)
POCT GLUCOSE: 290 MG/DL (ref 70–110)
POTASSIUM SERPL-SCNC: 4.4 MMOL/L
SODIUM SERPL-SCNC: 137 MMOL/L

## 2018-06-18 PROCEDURE — 93005 ELECTROCARDIOGRAM TRACING: CPT | Mod: 59

## 2018-06-18 PROCEDURE — 99152 MOD SED SAME PHYS/QHP 5/>YRS: CPT | Mod: GC,,, | Performed by: INTERNAL MEDICINE

## 2018-06-18 PROCEDURE — 75710 ARTERY X-RAYS ARM/LEG: CPT | Mod: 26,59,RT,GC | Performed by: INTERNAL MEDICINE

## 2018-06-18 PROCEDURE — 37227 CATH LAB PROCEDURE: CPT | Mod: RT,GC,, | Performed by: INTERNAL MEDICINE

## 2018-06-18 PROCEDURE — 93010 ELECTROCARDIOGRAM REPORT: CPT | Mod: ,,, | Performed by: INTERNAL MEDICINE

## 2018-06-18 PROCEDURE — C1894 INTRO/SHEATH, NON-LASER: HCPCS

## 2018-06-18 PROCEDURE — 25000003 PHARM REV CODE 250: Performed by: INTERNAL MEDICINE

## 2018-06-18 PROCEDURE — C1769 GUIDE WIRE: HCPCS

## 2018-06-18 PROCEDURE — 25000003 PHARM REV CODE 250

## 2018-06-18 PROCEDURE — 63600175 PHARM REV CODE 636 W HCPCS

## 2018-06-18 PROCEDURE — 82962 GLUCOSE BLOOD TEST: CPT | Mod: 91

## 2018-06-18 PROCEDURE — 27000014 CATH LAB PROCEDURE

## 2018-06-18 PROCEDURE — 80048 BASIC METABOLIC PNL TOTAL CA: CPT

## 2018-06-18 PROCEDURE — 86901 BLOOD TYPING SEROLOGIC RH(D): CPT

## 2018-06-18 RX ORDER — DIPHENHYDRAMINE HCL 50 MG
50 CAPSULE ORAL ONCE
Status: COMPLETED | OUTPATIENT
Start: 2018-06-18 | End: 2018-06-18

## 2018-06-18 RX ORDER — SODIUM CHLORIDE 9 MG/ML
3 INJECTION, SOLUTION INTRAVENOUS CONTINUOUS
Status: ACTIVE | OUTPATIENT
Start: 2018-06-18 | End: 2018-06-18

## 2018-06-18 RX ADMIN — DIPHENHYDRAMINE HYDROCHLORIDE 50 MG: 50 CAPSULE ORAL at 09:06

## 2018-06-18 RX ADMIN — SODIUM CHLORIDE 3 ML/KG/HR: 0.9 INJECTION, SOLUTION INTRAVENOUS at 09:06

## 2018-06-18 NOTE — H&P (VIEW-ONLY)
"lSubjective:    Patient ID:  Darren Ceballos is a 67 y.o. male who presents for evaluation of No chief complaint on file.    Referring Doctor: Dr. Bennie Erwin    HPI     66 y/o M w/ PAD, HTN, DM, CAD s/p PCI 12/27/17, peripheral neuropathy, PAD with right SFA  was referred by Dr. Erwin for R SFA  Intervention due to Raj IIb claudication and underwent PTA to an RFA  with successful recalculation on 5/3/18(Outback, Laser Atherectomy, and DCB x 3 with 5 x 150 mm balloons via L CFA and R DP access). 3 vessel runoff on right (outflow). He present today for follow up with [employer] RLExt which is concerning for occlusion of the SFA at site of previous intervention (pSFA/mSFA)    Pt denies any rest pain, skin breakdown, non-healing wound/ulcers, infections, skin discoloration/gangrene of R leg.  He does feel numbness/tingling all the time in the R leg. It does not worsen w/ exertion.      Failed PTA to R SFA  on 03/01/18 by Dr. Bennie Erwin. Recc RLE retrograde PTA by Dr. Dakota Arvizu    Failed PTA to R SFA  and R prox Popliteal on 11/16/17 by Dr. Bennie Erwin    Successful PTAS w/ atherectomy and stent to L SFA and L Pop 10/19/17 by by Dr. Bnenie Erwin    CTA w/ Run-Off 9/28/17:  Long  of R SFA  Multi-focal areas of mod-severe stenoses of L SFA  Three vessel run-off present b/l      ROS   Constitution: negative for - fever, chills, weight loss or weight gain  HENT: negative for - sore throat, rhinorrhea, or headache  Eyes: negative for - blurred or double vision  Cardiovascular: see above  Pulmonary: see above  Gastrointestinal: negative for - abdominal pain, nausea, vomiting, or diarrhea  : negative for - dysuria  Neurological: negative for - focal weakness or sensory changes       Objective:    Physical Exam   /72 (BP Location: Left arm, Patient Position: Sitting, BP Method: Large (Automatic))   Pulse 68   Ht 5' 8" (1.727 m)   Wt 88.5 kg (195 lb 1.7 oz)   SpO2 98%   BMI 29.67 kg/m²  "     Constitutional: No apparent distress, conversant  HEENT: Sclera anicteric, extraocular movements intact  Neck: No jugular venous distension, no carotid bruits  Cardiac: Regular rate and rhythm, no JVD,  no murmurs rubs or gallops, normal S1/S2, no LE edema  Pulm: Clear to auscultation bilaterally, no wheezes, rales, or ronchi  GI: Abdomen soft, nontender, nondistended  Skin: No ecchymosis, erythema, or ulcers  Psych: Alert and oriented to person place location, appropriate affect  Neuro: No focal deficits  Vascular:   RIGHT LEFT   RADIAL 2+ 2+   ULNAR 2+ 2+   BRACHIAL 2+ 2+   FEMORAL 1+ 2+   DP Monophasic Biphasic   TP Reduced Biphasic Biphasic   CHARITY'S TEST Normal Normal   BARBEAU TEST         Recent Interventions:  Failed PTA to R SFA  on 03/01/18 by Dr. Bennie Erwni. Recc RLE retrograde PTA by Dr. Dakota Arvizu    Failed PTA to R SFA  and R prox Popliteal on 11/16/17 by Dr. Bennie Erwin    Successful PTAS w/ atherectomy and stent to L SFA and L Pop 10/19/17 by by Dr. Bennie Erwin      CTA w/ Run-Off 9/28/17:  Long  of R SFA  Multi-focal areas of mod-severe stenoses of L SFA  Three vessel run-off present b/l    Assessment/Plan:   PAD (peripheral artery disease)  Right SFA re-occlusion s/p recent DCB  RLE Raj 2b Claudication    Plan forR SFA re-ntervention, Left CFA antegrade approach. Cross over. Will use Zilver PTX stents this time  - Anti-platelet Therapy: ASA 81, on home Plavix 75 mg  - Access: L CFA 6F cross-over sheath  - Catheters: ANTOINE  - Serum Creatinine/CrCl: Pending  - Allergies: No shellfish/Iodine allergy  - Pre-Hydration: 3 cc/kg/hr IV, continuous, for 1 hour, Pre-Procedure  - Pre-Op Med: Diphenhydramine (Benadryl) 50 mg, Oral, Once, Pre-Procedure   - The patient understands the risks and benefits and wishes to go ahead with the procedure.  - All patient's questions were answered.  - The risks, benefits & alternatives of the procedure were explained to the patient.   - The risks of  peripheral angiography include but are not limited to: Bleeding, infection, heart rhythm abnormalities, allergic reactions, kidney injury requiring dilaysis, limb loss, stroke and death.   - Should stenting be indicated, the patient has agreed to dual anti-platelet therapy for 1-consecutive year with a drug-eluting stent and a minimum of 1-month with the use of a bare metal stent.   - Additionally, pt is aware that non-compliance is likely to result in stent clotting with heart attack, heart failure, and/or death.  - The risks of moderate sedation include hypotension, respiratory depression, arrhythmias, bronchospasm, & death.   - Informed consent was obtained & the patient is agreeable to proceed with the procedure.  - This patient was discussed with the attending interventional cardiologist who agrees with the above assessment & plan.       HTN (hypertension)  Controlled. Continue with current medication.    Peripheral neuropathy  Continue with neuroleptics.    Clay Park MD  PGY-7  Interventional Cardiology     I have personally taken the history and examined this patient. I have discussed and agree with the resident's findings and plan as documented in the resident's note.  Repeat angioplasty and supera stent.  Jorge Thomas

## 2018-06-18 NOTE — BRIEF OP NOTE
"    Post Cath Note  Referring Physician: Jorge Thomas MD  Procedure: Angiogram Extremity Unilateral (N/A)       Access: Left CFA  S/p PTA x 2 L SFA      See full report for further details    Intervention:   S/p PTA x 2 L SFA    Closure device: Manual pressure    Post Cath Exam:   BP (!) 145/79 (BP Location: Right arm, Patient Position: Lying)   Pulse 60   Temp 97.1 °F (36.2 °C)   Resp 16   Ht 5' 8" (1.727 m)   Wt 86.2 kg (190 lb)   SpO2 98%   BMI 28.89 kg/m²   No unusual pain, hematoma, thrill or bruit at vascular access site.  Distal pulse present without signs of ischemia.    Recommendations:   - Routine post-cath care  - IVF at 3cc/kg/hr for 4 hrs  - Smoking cessation counseling, Continue medical management, Risk factor reduction, Plavix for at least 1 year and ASA 81 mg indefinitely, Follow-up with outpatient cardiologist    Ty Gonzales MD  PGY-5 [phone])  Cardiology Fellow      "

## 2018-06-18 NOTE — INTERVAL H&P NOTE
The patient has been examined and the H&P has been reviewed:    I concur with the findings and no changes have occurred since H&P was written.    Anesthesia/Surgery risks, benefits and alternative options discussed and understood by patient/family.      Attending addendum to follow     Marilu Monzon MD  Cardiology Fellow  Pager: [phone]        Active Hospital Problems    Diagnosis  POA    PAD (peripheral artery disease) [I73.9]  Yes      Resolved Hospital Problems    Diagnosis Date Resolved POA   No resolved problems to display.

## 2018-06-18 NOTE — NURSING
Ambulated on unit this morning with daughter at side.  No c/o pain.  resp even and unlabored.  Denies SOB.  Verbalized understanding of procedure.  Will continue to monitor.

## 2018-06-18 NOTE — PLAN OF CARE
Problem: Patient Care Overview  Goal: Plan of Care Review  Outcome: Ongoing (interventions implemented as appropriate)  Received report from RADHA Hobbs. Patient s/p Right SFA blockage arthrectomy, baloon, stent, AAOx3. VSS, no c/o pain or discomfort at this time, resp even and unlabored. left groin gauze/tegaderm dressing is CDI. Pulses 1+.  No active bleeding. No hematoma noted. Post procedure protocol reviewed with patient and patient's family. Understanding verbalized. Family members at bedside. Nurse call bell within reach. Will continue to monitor per post procedure protocol.

## 2018-06-19 VITALS
WEIGHT: 190 LBS | HEIGHT: 68 IN | BODY MASS INDEX: 28.79 KG/M2 | RESPIRATION RATE: 18 BRPM | HEART RATE: 73 BPM | OXYGEN SATURATION: 98 % | SYSTOLIC BLOOD PRESSURE: 142 MMHG | TEMPERATURE: 98 F | DIASTOLIC BLOOD PRESSURE: 81 MMHG

## 2018-06-19 LAB
BASOPHILS # BLD AUTO: 0.02 K/UL
BASOPHILS NFR BLD: 0.2 %
DIFFERENTIAL METHOD: ABNORMAL
EOSINOPHIL # BLD AUTO: 0.2 K/UL
EOSINOPHIL NFR BLD: 2.5 %
ERYTHROCYTE [DISTWIDTH] IN BLOOD BY AUTOMATED COUNT: 13.9 %
HCT VFR BLD AUTO: 34.1 %
HGB BLD-MCNC: 10.8 G/DL
IMM GRANULOCYTES # BLD AUTO: 0.12 K/UL
IMM GRANULOCYTES NFR BLD AUTO: 1.3 %
LYMPHOCYTES # BLD AUTO: 1 K/UL
LYMPHOCYTES NFR BLD: 10.9 %
MCH RBC QN AUTO: 26.7 PG
MCHC RBC AUTO-ENTMCNC: 31.7 G/DL
MCV RBC AUTO: 84 FL
MONOCYTES # BLD AUTO: 0.7 K/UL
MONOCYTES NFR BLD: 7.9 %
NEUTROPHILS # BLD AUTO: 6.9 K/UL
NEUTROPHILS NFR BLD: 77.2 %
NRBC BLD-RTO: 0 /100 WBC
PLATELET # BLD AUTO: 194 K/UL
PMV BLD AUTO: 11.8 FL
POC ACTIVATED CLOTTING TIME K: 208 SEC (ref 74–137)
POC ACTIVATED CLOTTING TIME K: 224 SEC (ref 74–137)
POC ACTIVATED CLOTTING TIME K: 246 SEC (ref 74–137)
POC ACTIVATED CLOTTING TIME K: 296 SEC (ref 74–137)
RBC # BLD AUTO: 4.05 M/UL
SAMPLE: ABNORMAL
WBC # BLD AUTO: 8.91 K/UL

## 2018-06-19 PROCEDURE — 85025 COMPLETE CBC W/AUTO DIFF WBC: CPT

## 2018-06-19 PROCEDURE — 36415 COLL VENOUS BLD VENIPUNCTURE: CPT

## 2018-06-19 PROCEDURE — 25000003 PHARM REV CODE 250: Performed by: STUDENT IN AN ORGANIZED HEALTH CARE EDUCATION/TRAINING PROGRAM

## 2018-06-19 RX ORDER — TRAMADOL HYDROCHLORIDE 50 MG/1
50 TABLET ORAL EVERY 8 HOURS PRN
Status: DISCONTINUED | OUTPATIENT
Start: 2018-06-19 | End: 2018-06-19 | Stop reason: HOSPADM

## 2018-06-19 RX ADMIN — TRAMADOL HYDROCHLORIDE 50 MG: 50 TABLET, FILM COATED ORAL at 02:06

## 2018-06-19 NOTE — NURSING
Pt d/c'd to home per md orders via w/c.  Vss.  l groin site remains betsy.  0 bleed, 0 hematoma.  Instructed pt on home medications, post procedure precautions and follow up visits. Pt and family verbalizes understanding. Pt in no acute distress and verbalizes understanding.

## 2018-06-19 NOTE — ASSESSMENT & PLAN NOTE
66 y/o M w/ PAD, HTN, DM, CAD s/p PCI 12/27/17, peripheral neuropathy, PAD with right SFA  was referred by Dr. Erwin for R SFA  Intervention due to Raj IIb claudication and underwent PTA to an RFA  with successful recalculation on 5/3/18(Outback, Laser Atherectomy, and DCB x 3 with 5 x 150 mm balloons via L CFA and R DP access). Now s/p PTA to R SFA with Dr. Thomas. Tolerated well    --ASA + plavix x 1 year then ASA indefinitely  --continue current medical management  --f/u with Dr. Thomas; will contact Dr. Thomas with recurrent symptoms should they occur

## 2018-06-19 NOTE — DISCHARGE SUMMARY
[facility]  Interventional Cardiology  Discharge Summary      Patient Name: Darren Ceballos  MRN: [id number]  Admission Date: 6/18/2018  Hospital Length of Stay: 0 days  Discharge Date and Time:  06/19/2018 8:01 AM  Attending Physician: Jorge Thomas MD  Discharging Provider: Ty Dial MD  Primary Care Physician: Hawk Kowalski MD    HPI:  68 y/o M w/ PAD, HTN, DM, CAD s/p PCI 12/27/17, peripheral neuropathy, PAD with right SFA  was referred by Dr. Erwin for R SFA  Intervention due to Raj IIb claudication and underwent PTA to an RFA  with successful recalculation on 5/3/18(Outback, Laser Atherectomy, and DCB x 3 with 5 x 150 mm balloons via L CFA and R DP access). Presented 6/17 for PTA to RFA     Procedure(s) (LRB):  Angiogram Extremity Unilateral (N/A)     Indwelling Lines/Drains at time of discharge:  Lines/Drains/Airways          No matching active lines, drains, or airways          Hospital Course:  Underwent PTA x 2 to R SFA. Tolerated well        Significant Diagnostic Studies: Labs: All labs within the past 24 hours have been reviewed    Pending Diagnostic Studies:     None        * PAD (peripheral artery disease)    68 y/o M w/ PAD, HTN, DM, CAD s/p PCI 12/27/17, peripheral neuropathy, PAD with right SFA  was referred by Dr. Erwin for R SFA  Intervention due to Raj IIb claudication and underwent PTA to an RFA  with successful recalculation on 5/3/18(Outback, Laser Atherectomy, and DCB x 3 with 5 x 150 mm balloons via L CFA and R DP access). Now s/p PTA to R SFA with Dr. Thomas. Tolerated well    --ASA + plavix x 1 year then ASA indefinitely  --continue current medical management  --f/u with Dr. Thomas; will contact Dr. Thomas with recurrent symptoms should they occur            Discharged Condition: good    Follow Up:   No future appointments.   Will follow with Dr. Thomas     Patient Instructions:   No discharge procedures on file.  Patient can resume cardiac diet. No vigorous physical activity x 1 week  Medications:  Reconciled Home Medications:      Medication List      CONTINUE taking these medications    aspirin 81 MG Chew  Take 81 mg by mouth once daily.     atorvastatin 40 MG tablet  Commonly known as:  LIPITOR  Take 40 mg by mouth once daily.     clopidogrel 75 mg tablet  Commonly known as:  PLAVIX  Take 75 mg by mouth once daily.     diltiaZEM 180 MG 24 hr capsule  Commonly known as:  CARDIZEM CD  Take 180 mg by mouth once daily.     famotidine 20 MG tablet  Commonly known as:  PEPCID  Take 20 mg by mouth.     gabapentin 300 MG capsule  Commonly known as:  NEURONTIN  Take 300 mg by mouth 3 (three) times daily.     isosorbide mononitrate 30 MG 24 hr tablet  Commonly known as:  IMDUR  Take 30 mg by mouth 2 (two) times daily.     lisinopril 5 MG tablet  Commonly known as:  PRINIVIL,ZESTRIL     metFORMIN 1000 MG tablet  Commonly known as:  GLUCOPHAGE  Take 1,000 mg by mouth 2 (two) times daily.     multivitamin capsule  Take 1 capsule by mouth.     NITROSTAT 0.4 MG SL tablet  Generic drug:  nitroGLYCERIN  Take 0.4 mg by mouth.            Time spent on the discharge of patient: 30 minutes    Ty Dial MD  Interventional Cardiology  [facility]

## 2018-06-19 NOTE — HPI
66 y/o M w/ PAD, HTN, DM, CAD s/p PCI 12/27/17, peripheral neuropathy, PAD with right SFA  was referred by Dr. Erwin for R SFA  Intervention due to Raj IIb claudication and underwent PTA to an RFA  with successful recalculation on 5/3/18(Outback, Laser Atherectomy, and DCB x 3 with 5 x 150 mm balloons via L CFA and R DP access). Presented 6/17 for PTA to RFA

## 2018-06-19 NOTE — PLAN OF CARE
Problem: Patient Care Overview  Goal: Plan of Care Review  Outcome: Ongoing (interventions implemented as appropriate)  L groin site remains CDI, no bleeding or hematoma noted.  Pulses 1+ to right leg. No complaints of pain at this time. Pt remains on bedrest till 11 pm. Will cont to monitor.

## 2018-06-21 LAB
PERIPHERAL PTA: YES
PERIPHERAL STENOSIS: ABNORMAL
PERIPHERAL STENT: YES